# Patient Record
Sex: MALE | Race: WHITE | Employment: FULL TIME | URBAN - METROPOLITAN AREA
[De-identification: names, ages, dates, MRNs, and addresses within clinical notes are randomized per-mention and may not be internally consistent; named-entity substitution may affect disease eponyms.]

---

## 2022-10-17 ENCOUNTER — APPOINTMENT (OUTPATIENT)
Dept: GENERAL RADIOLOGY | Age: 52
End: 2022-10-17
Payer: COMMERCIAL

## 2022-10-17 ENCOUNTER — HOSPITAL ENCOUNTER (EMERGENCY)
Age: 52
Discharge: HOME | End: 2022-10-17
Attending: STUDENT IN AN ORGANIZED HEALTH CARE EDUCATION/TRAINING PROGRAM
Payer: COMMERCIAL

## 2022-10-17 VITALS
RESPIRATION RATE: 17 BRPM | WEIGHT: 245 LBS | HEIGHT: 68 IN | BODY MASS INDEX: 37.13 KG/M2 | SYSTOLIC BLOOD PRESSURE: 146 MMHG | OXYGEN SATURATION: 99 % | HEART RATE: 79 BPM | DIASTOLIC BLOOD PRESSURE: 74 MMHG | TEMPERATURE: 98 F

## 2022-10-17 DIAGNOSIS — M25.561 ACUTE PAIN OF RIGHT KNEE: Primary | ICD-10-CM

## 2022-10-17 DIAGNOSIS — Q78.8 OSTEOPOIKILOSIS: ICD-10-CM

## 2022-10-17 PROCEDURE — 73564 X-RAY EXAM KNEE 4 OR MORE: CPT

## 2022-10-17 PROCEDURE — 99283 EMERGENCY DEPT VISIT LOW MDM: CPT

## 2022-10-17 ASSESSMENT — ENCOUNTER SYMPTOMS
EYE PAIN: 0
SORE THROAT: 0
EYE REDNESS: 0
NAUSEA: 0
BACK PAIN: 0
DIARRHEA: 0
SINUS PRESSURE: 0
COUGH: 0
VOMITING: 0
SHORTNESS OF BREATH: 0
WHEEZING: 0
EYE DISCHARGE: 0
ABDOMINAL PAIN: 0

## 2022-10-17 ASSESSMENT — PAIN SCALES - GENERAL
PAINLEVEL_OUTOF10: 7
PAINLEVEL_OUTOF10: 10

## 2022-10-17 ASSESSMENT — PAIN DESCRIPTION - ORIENTATION: ORIENTATION: RIGHT

## 2022-10-17 ASSESSMENT — PAIN DESCRIPTION - ONSET: ONSET: SUDDEN

## 2022-10-17 ASSESSMENT — PAIN DESCRIPTION - FREQUENCY: FREQUENCY: CONTINUOUS

## 2022-10-17 ASSESSMENT — PAIN DESCRIPTION - PAIN TYPE: TYPE: ACUTE PAIN

## 2022-10-17 ASSESSMENT — PAIN - FUNCTIONAL ASSESSMENT
PAIN_FUNCTIONAL_ASSESSMENT: 0-10
PAIN_FUNCTIONAL_ASSESSMENT: 0-10

## 2022-10-17 ASSESSMENT — PAIN DESCRIPTION - LOCATION: LOCATION: KNEE

## 2022-10-17 ASSESSMENT — PAIN DESCRIPTION - DESCRIPTORS: DESCRIPTORS: ACHING

## 2022-10-17 NOTE — ED PROVIDER NOTES
Department of Emergency Medicine   ED  Provider Note  Admit Date/RoomTime: 10/17/2022 11:17 AM  ED Room: 13/13              Citlali Go is a 45 y.o. male with a PMHx significant for None who presents for evaluation of right knee pain, beginning at arrival.  The complaint has been persistent, moderate in severity, and worsened by nothing. The patient states that he was feeling some discomfort behind his right knee about a month ago. Saw his PCP, was told it was secondary to running so much and was told to refrain from running. Notes that incident 2 weeks he did for 4 weeks and was on some meloxicam for this. Patient states that today was his first day back, did some light weights then went for a jog on the treadmill. States he was not on an incline going at a low speed about 5.5 when he felt a pop in his right knee and immediately had discomfort and pain. Denies any trauma to the area. The history is provided by the patient and medical records. Review of Systems   Constitutional:  Negative for chills and fever. HENT:  Negative for ear pain, sinus pressure and sore throat. Eyes:  Negative for pain, discharge and redness. Respiratory:  Negative for cough, shortness of breath and wheezing. Cardiovascular:  Negative for chest pain. Gastrointestinal:  Negative for abdominal pain, diarrhea, nausea and vomiting. Genitourinary:  Negative for dysuria and frequency. Musculoskeletal:  Negative for arthralgias and back pain. Right knee pain   Skin:  Negative for rash and wound. Neurological:  Negative for weakness and headaches. Hematological:  Negative for adenopathy. All other systems reviewed and are negative. Physical Exam  Vitals and nursing note reviewed. Constitutional:       General: He is not in acute distress. Appearance: Normal appearance. He is well-developed. He is not ill-appearing. HENT:      Head: Normocephalic and atraumatic.       Right Ear: External ear normal.      Left Ear: External ear normal.   Eyes:      General:         Right eye: No discharge. Left eye: No discharge. Extraocular Movements: Extraocular movements intact. Conjunctiva/sclera: Conjunctivae normal.   Cardiovascular:      Rate and Rhythm: Normal rate and regular rhythm. Heart sounds: Normal heart sounds. Pulmonary:      Effort: Pulmonary effort is normal. No respiratory distress. Breath sounds: Normal breath sounds. No stridor. Abdominal:      General: There is no distension. Palpations: Abdomen is soft. Musculoskeletal:         General: Tenderness present. Cervical back: Normal range of motion and neck supple. Comments: Decreased range of motion right lower extremity secondary to pain. There is no ligament laxity on valgus or varus stress test  There was some discomfort with posterior drawer and anterior drawer   Skin:     General: Skin is warm and dry. Coloration: Skin is not jaundiced or pale. Neurological:      General: No focal deficit present. Mental Status: He is alert. Procedures    SABA Yip presents to the ED for evaluation of right knee pain. Patient was on the treadmill when he heard a pop. . 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 . Patient was given crutches and Ace wrap for their symptoms. Patient continues to be non-toxic on re-evaluation. Findings were discussed with the patient and reasons to immediately return to the ED were articulated to them. They will follow-up with their orthopedic surgeon, Dr. Slava Parker. --------------------------------------------- PAST HISTORY ---------------------------------------------  Past Medical History:  has no past medical history on file. Past Surgical History:  has a past surgical history that includes lymph node biopsy and Finger amputation.     Social History: reports that he has never smoked. He quit smokeless tobacco use about 33 years ago. His smokeless tobacco use included chew. He reports current alcohol use. He reports that he does not use drugs. Family History: family history is not on file. The patients home medications have been reviewed. Allergies: Patient has no known allergies. -------------------------------------------------- RESULTS -------------------------------------------------  Labs:  No results found for this visit on 10/17/22. Radiology:  XR KNEE RIGHT (MIN 4 VIEWS)   Final Result   There are innumerable sclerotic lesions within the distal femur, proximal   tibia, and proximal fibula which likely represent enostoses associated with   osteopoikilosis. No acute bony abnormality.             ------------------------- NURSING NOTES AND VITALS REVIEWED ---------------------------  Date / Time Roomed:  10/17/2022 11:17 AM  ED Bed Assignment:  13/13    The nursing notes within the ED encounter and vital signs as below have been reviewed. BP (!) 151/84   Pulse 87   Temp 98 °F (36.7 °C) (Oral)   Resp 16   Ht 5' 8\" (1.727 m)   Wt 245 lb (111.1 kg)   SpO2 99%   BMI 37.25 kg/m²   Oxygen Saturation Interpretation: Normal      ------------------------------------------ PROGRESS NOTES ------------------------------------------  12:45 PM EDT  I have spoken with the patient and discussed todays results, in addition to providing specific details for the plan of care and counseling regarding the diagnosis and prognosis. Their questions are answered at this time and they are agreeable with the plan. I discussed at length with them reasons for immediate return here for re evaluation. They will followup with their orthopedic physician by calling their office tomorrow.       --------------------------------- ADDITIONAL PROVIDER NOTES ---------------------------------  At this time the patient is without objective evidence of an acute process requiring hospitalization or inpatient management. They have remained hemodynamically stable throughout their entire ED visit and are stable for discharge with outpatient follow-up. The plan has been discussed in detail and they are aware of the specific conditions for emergent return, as well as the importance of follow-up. New Prescriptions    No medications on file       Diagnosis:  1. Acute pain of right knee    2. Osteopoikilosis        Disposition:  Patient's disposition: Discharge to home  Patient's condition is stable.        Enrico Luong DO  10/17/22 1246

## 2022-10-17 NOTE — DISCHARGE INSTRUCTIONS
Follow up with your orthopedic surgeon  If symptoms worsen or concern arises return for re-evaluation

## 2022-10-27 ENCOUNTER — OFFICE VISIT (OUTPATIENT)
Dept: ORTHOPEDIC SURGERY | Age: 52
End: 2022-10-27
Payer: COMMERCIAL

## 2022-10-27 VITALS — HEIGHT: 68 IN | WEIGHT: 245 LBS | BODY MASS INDEX: 37.13 KG/M2

## 2022-10-27 DIAGNOSIS — S76.311A PARTIAL HAMSTRING TEAR, RIGHT, INITIAL ENCOUNTER: Primary | ICD-10-CM

## 2022-10-27 DIAGNOSIS — M25.561 ACUTE PAIN OF RIGHT KNEE: ICD-10-CM

## 2022-10-27 PROCEDURE — 99204 OFFICE O/P NEW MOD 45 MIN: CPT | Performed by: STUDENT IN AN ORGANIZED HEALTH CARE EDUCATION/TRAINING PROGRAM

## 2022-10-27 NOTE — PROGRESS NOTES
New Knee Patient     Referring Provider:   No referring provider defined for this encounter. CHIEF COMPLAINT:   Chief Complaint   Patient presents with    Knee Pain     Right knee pain / date of incident 1 week and 3 days ago, working out on treadmill and felt a pop, felt pain and had difficulty walking. Last month running felt pain, saw PCP told he had strained tendons, took one month off from running. Went to [facility], x-rays done in epic        HPI:    Anita Frias is a 45y.o. year old male who injured his right knee about 1 week ago. He has had on and off knee pain for a couple of months. Last week he was working on a treadmill and felt a pop. He had difficulty ambulating afterwards. The pain is located in the posterior lateral knee along his biceps femoris tendon. Pain is sharp and nonradiating. Denies numbness tingling. Denies fevers and chills. PAST MEDICAL HISTORY  History reviewed. No pertinent past medical history. PAST SURGICAL HISTORY  Past Surgical History:   Procedure Laterality Date    FINGER AMPUTATION      LYMPH NODE BIOPSY           FAMILY HISTORY   History reviewed. No pertinent family history.     SOCIAL HISTORY  Social History     Socioeconomic History    Marital status:      Spouse name: Not on file    Number of children: Not on file    Years of education: Not on file    Highest education level: Not on file   Occupational History    Not on file   Tobacco Use    Smoking status: Never    Smokeless tobacco: Former     Types: Chew     Quit date: 1/6/1989   Substance and Sexual Activity    Alcohol use: Yes     Comment: ocassional    Drug use: No    Sexual activity: Yes   Other Topics Concern    Not on file   Social History Narrative    Not on file     Social Determinants of Health     Financial Resource Strain: Not on file   Food Insecurity: Not on file   Transportation Needs: Not on file   Physical Activity: Not on file   Stress: Not on file   Social Connections: Not on file   Intimate Partner Violence: Not on file   Housing Stability: Not on file     Social History     Occupational History    Not on file   Tobacco Use    Smoking status: Never    Smokeless tobacco: Former     Types: Chew     Quit date: 1/6/1989   Substance and Sexual Activity    Alcohol use: Yes     Comment: ocassional    Drug use: No    Sexual activity: Yes       CURRENT MEDICATIONS   No current outpatient medications on file. ALLERGIES  No Known Allergies    Controlled Substances Monitoring:          REVIEW OF SYSTEMS:     Constitutional:  Negative for weight loss, fevers, chills, fatigue  Cardiovascular: Negative for chest pain, palpitations  Pulmonary: Negative for shortness of breath, labored breathing, cough  GI: negative for abdominal pain, nausea, vomitting   MSK: per HPI  Skin: negative for rash, open wounds    All other systems reviewed and are negative         PHYSICAL EXAM     Vitals:    10/27/22 0913   Weight: 245 lb (111.1 kg)   Height: 5' 8\" (1.727 m)       Height: 5' 8\" (1.727 m)  Weight: [unfilled]  BMI:  Body mass index is 37.25 kg/m². General: The patient is alert and oriented x 3, appears to be stated age and in no distress. HEENT: head is normocephalic, atraumatic. EOMI. Neck: supple, trachea midline, no thyromegaly   Cardiovascular: peripheral pulses palpable. Normal Capillary refill   Respiratory: breathing unlabored, chest expansion symmetric   Skin: no rash, no open wounds, no erythema  Psych: normal affect; mood stable  Neurologic: gait normal, sensation grossly intact in extremities  MSK:        Lower Extremity:   Ipsilateral hip exam shows normal range of motion without pain with impingement testing. Right knee: Atraumatic. No effusion. Full range of motion from 0 to 130 degrees. Knee stable varus valgus stress testing at 0 and 30 degrees. Negative Lachman. Negative posterior drawer. Nontender along the medial lateral joint line.   He has pain with resisted knee flexion posterior lateral.  He is tender along his biceps femoris tendon. IMAGING:    XR: 4 views of the right knee demonstrate no acute fractures dislocations. He has sclerotic lesions within his proximal tibia and distal femur consistent with osteopoikilosis. Joint spaces seem well-maintained. ASSESSMENT  Right knee biceps femoris strain versus partial tear    PLAN  -Conservative treatment discussed in detail. I Traci Gutierrez give the patient a hinged knee brace for comfort. He is to take a month off of running. I think this will heal without conservative treatment over-the-counter anti-inflammatories and ice. We will get him in formal physical therapy. If this is no better in a month we can talk about getting an MRI to to further assess. All of his questions were answered in detail.   He will follow-up on a as needed basis      Anjali Cruz, DO  Orthopaedic Surgery   10/27/22   9:12 AM KASSANDRAT

## 2022-11-14 ENCOUNTER — EVALUATION (OUTPATIENT)
Dept: PHYSICAL THERAPY | Age: 52
End: 2022-11-14
Payer: COMMERCIAL

## 2022-11-14 DIAGNOSIS — S76.311A PARTIAL HAMSTRING TEAR, RIGHT, INITIAL ENCOUNTER: Primary | ICD-10-CM

## 2022-11-14 PROCEDURE — 97110 THERAPEUTIC EXERCISES: CPT | Performed by: PHYSICAL THERAPIST

## 2022-11-14 PROCEDURE — 97161 PT EVAL LOW COMPLEX 20 MIN: CPT | Performed by: PHYSICAL THERAPIST

## 2022-11-14 NOTE — PROGRESS NOTES
[facility]          Phone: [phone] Fax: [phone]    Physical Therapy Daily Treatment Note  Date:  2022    Patient Name:  Kalee Cade    :  1970  MRN: [id number]    Evaluating therapist: Lashay Santana, PT, DPT  [id number]    Restrictions/Precautions:      Diagnosis:     Diagnosis Orders   1. Partial hamstring tear, right, initial encounter          Treatment Diagnosis:    Insurance/Certification information:  [employer]  Referring Physician:  Nitin Coats DO  Plan of care signed (Y/N):    Visit# / total visits:    Pain level: 1/10   Time In:  1300  Time Out:  1333    Subjective:  See initial eval    Exercises:  Exercise/Equipment Resistance/Repetitions Other comments            Hamstring stretch 30 sec x3 reps R LE      SLR 10 x2      SLR for VMO 10x      S/L Hip abduction 10 x2      Seated HS curl 10 x2 Purple TB            Bridges with HS curl on ball       Standing 3-way slide out       Step ups (fwd/lat)        Treadmill       Bike                                                        Other Therapeutic Activities:  Pt tolerated introduction of strengthening and demonstrated good technique/understanding for HEP. Pt noted to have muscular fatigue following TE's.     Home Exercise Program:  HS stretch, SLR, SLR for VMO, S/L Hip abduction, Seated HS curl    Manual Treatments:  N/A    Modalities:  N/A     Time-in Time-out Total Time   89672  Evaluation Low Complexity 1300 1312 12   16502  Evaluation Med Complexity      89665  Evaluation High Complexity      80399  Ther Ex 1312 1333 21   68627  Neuro Re-ed        89215  Ther Activities        14363  Manual Therapy       81239  E-stim       74183  Ultrasound            Session 7637 0141 33       Treatment/Activity Tolerance:  [x] Patient tolerated treatment well [] Patient limited by fatigue  [] Patient limited by pain  [] Patient limited by other medical complications  [] Other:     Prognosis: [x] Good [] Fair  [] Poor    Patient Requires Follow-up: [x] Yes  [] No    Plan:   [x] Continue per plan of care [] Alter current plan (see comments)  [] Plan of care initiated [] Hold pending MD visit [] Discharge    Plan for Next Session:  progress weight on SLR as tolerated, progress standing and dynamic knee stabilization exercises      Electronically signed by:  Gerri Payan, PT, DPT  [id number]

## 2022-11-14 NOTE — PROGRESS NOTES
[facility]   Phone: [phone]   Fax: [phone]         Date:  2022   Patient: Bev Lutz  : 1970  MRN: [id number]  Referring Provider: Bryan Jewell DO  [address]  [facility]     Medical Diagnosis:      Diagnosis Orders   1. Partial hamstring tear, right, initial encounter             SUBJECTIVE:     Onset date: Oct 16, 2022    Onset: Sudden onset    Mechanism of Injury: Walking on treadmill when R knee popped. Pt was working out with walking and gradually progressing into jogging then knee popped. Ortho dx with partial tear vs R biceps femoris strain. Previous PT: none     Medical Management for Current Problem:  Colton Merino Hinged ROM brace, no restriction of movement noted    Chief complaint: weakness, unable to run / difficulty running , difficulty with stairs    Behavior: condition is getting better    Pain: intermittent  Current: 1/10     Best: 0/10     Worst:6/10    Symptom Type/Quality: aching  Location[de-identified] Knee: lateral, popliteral     Aggravated by:  nothing identified at this time    Relieved by: nothing    Imaging results: XR KNEE RIGHT (MIN 4 VIEWS)    Result Date: 10/17/2022  EXAMINATION: FOUR XRAY VIEWS OF THE RIGHT KNEE 10/17/2022 11:29 am COMPARISON: None. HISTORY: ORDERING SYSTEM PROVIDED HISTORY: pain TECHNOLOGIST PROVIDED HISTORY: Reason for exam:->pain FINDINGS: AP, lateral, and bilateral oblique views of the right knee were obtained. There are numerous sclerotic bony lesions about the distal femur, proximal tibia, and proximal fibula. These likely represent multiple enostoses associated with osteopoikilosis. There is no acute fracture or dislocation. Joint spaces are preserved in all 3 compartments without evidence of osteophytosis. There is no definite suprapatellar knee joint effusion. The visualized soft tissue structures are unremarkable.      There are innumerable sclerotic lesions within the distal femur, proximal tibia, and proximal fibula which likely represent enostoses associated with osteopoikilosis. No acute bony abnormality. Past Medical History:  No past medical history on file. Past Surgical History:   Procedure Laterality Date    FINGER AMPUTATION      LYMPH NODE BIOPSY         Medications:   No current outpatient medications on file. Current Facility-Administered Medications   Medication Dose Route Frequency Provider Last Rate Last Admin    betamethasone acetate-betamethasone sodium phosphate (CELESTONE) injection 12 mg  12 mg Intra-artICUlar Once Mahnaz Agosto MD        lidocaine 1 % injection 2 mL  2 mL IntraDERmal Once Mahnaz Agosto MD           Occupation:  Criminal justice educator . Physical demands include: standing, sitting. Status: full time. Exercise regimen: weight training , running, rowing, cycle    Hobbies: golfing, cards/gambling    Patient Goals:  return to running, get back to normal    Precautions/Contraindications:  brace for 2 weeks, start therapy, follow up with ortho in 30 days if no improvement. Family MD recommended against running.      OBJECTIVE:     Observations: well nourished male    Inspection: normal orthopedic exam    Edema: none     Gait: normal    Joint/Motion:    Knee:  Right:   AROM: 125° Flexion,  0° Extension    Left:   AROM: 123° Flexion,  0° Extension      Strength:    Knee:   Right: Hip: 5/5, Knee: Flexion 4-/5,  Extension 4+/5  Left: Hip: 5/5, Knee: Flexion 5/5,  Extension 5/5    Palpation: No tenderness     Special Tests/Functional Screens:    [] Lachman's []+ / [] -    [] Anterior Drawer []+ / [] -   [] Valgus Stress []+ / [] -  [] Thessaly Test []+ / [] -   [] Kallie's Sign []+ / [] -   [] Apley compression: []+ / [] - [] Bounce Home []+ / [] -   [] Adrián []+ / [] -   [] Pivot Shift []+ / [] -   [] Posterior Drawer []+ / [] -   [] Varus Stress []+ / [] -   [] Patellar Tracking: []+ / [] -     Special test comments: N/A      ASSESSMENT     Outcome Measure:   Lower Extremity Functional Scale (LEFS) 52/80    Problems:   Pain reported 1-6/10  Strength decreased in R quad  Decreased functional ability with running, stairs, use of right lower extremity, inability to participate in exercise regimen / fitness program    Reason for Skilled Care: Pt presents to therapy s/p injury to R lateral hamstring at biceps femoris region. Pt has R quad weakness following injury and reports he has been limited in workout routine to allow healing. He will benefit from skilled PT services to improve strength and stability of R knee to allow return to running and full workout. [x] There are no barriers affecting plan of care or recovery    [] Barriers to this patient's plan of care or recovery include. Domestic Concerns:  [x] No  [] Yes:    Long Term goals (4-6 weeks)  Decrease reported pain to 0-1/10  Increase Strength to 5/5   Able to perform/complete the following functions/tasks: jog for 20 minutes with 0-1/10 R knee/hamstring discomfort, ascend/descend 10 stairs with reciprocal pattern and no HR with 0-1/10 discomfort  LEFS 72/80  Independent with Home Exercise Programs    Rehab Potential: [x] Good  [] Fair  [] Poor    PLAN       Treatment Plan:   [x] Therapeutic Exercise  [x] Therapeutic Activity  [x] Neuromuscular Re-education   [x] Gait Training  [x] Balance Training  [] Aerobic conditioning  [x] Manual Therapy  [] Massage/Fascial release   [] Work/Sport specific activities    [] Pain Neuroscience [x] Cold/hotpack  [] Vasocompression  [x] Electrical Stimulation  [] Lumbar/Cervical Traction  [x] Ultrasound   [] Iontophoresis: 4 mg/mL Dexamethasone Sodium Phosphate 40-80 mAmin  [x] Dry Needling      [x] Instruction in HEP      []  Medication allergies reviewed for use of Dexamethasone Sodium Phosphate 4mg/ml  with iontophoresis treatments. Patient is not allergic.       The following CPT codes are likely to be used in the care of this patient: 46846 PT Evaluation: Low Complexity, [id number] PT Re-Evaluation, 70061 Therapeutic Exercise, [id number] Neuromuscular Re-Education, 75891 Therapeutic Activities, 90255 Manual Therapy, 89964 Gait Training, 75842 Electric Stimulation, and [id number] Ultrasound      Suggested Professional Referral: [x] No  [] Yes:     Patient Education:  [x] Plans/Goals, Risks/Benefits discussed  [x] Home exercise program  Method of Education: [x] Verbal  [x] Demo  [x] Written  Comprehension of Education:  [x] Verbalizes understanding. [x] Demonstrates understanding. [] Needs Review. [] Demonstrates/verbalizes understanding of HEP/Ed previously given. Frequency: 1-2 days per week for 4-6 weeks    Patient understands diagnosis/prognosis and consents to treatment, plan and goals: [x] Yes    [] No     Thank you for the opportunity to work with your patient. If you have questions or comments, please contact me at numbers listed above. Electronically signed by: Johanna Michel, PT, DPT  [id number]    Medicare Patients Only     Please sign Physician's Certification and return to: [address]  [facility]  [address] [id number]  Dept: [phone]  Dept Fax: [phone]  Loc: [phone] Certification / Comments     Frequency/Duration 1-2 days per week for 4-6 weeks. Certification period from 11/14/2022  to 2/17/2023. I have reviewed the Plan of Care established for skilled therapy services and certify that the services are required and that they will be provided while the patient is under my care.     Physician's Comments/Revisions:               Physician's Printed Name:                                           [de-identified] Signature:                                                               Date:

## 2022-11-16 ENCOUNTER — TREATMENT (OUTPATIENT)
Dept: PHYSICAL THERAPY | Age: 52
End: 2022-11-16
Payer: COMMERCIAL

## 2022-11-16 DIAGNOSIS — S76.311A PARTIAL HAMSTRING TEAR, RIGHT, INITIAL ENCOUNTER: Primary | ICD-10-CM

## 2022-11-16 PROCEDURE — 97110 THERAPEUTIC EXERCISES: CPT

## 2022-11-16 NOTE — PROGRESS NOTES
[facility]          Phone: [phone] Fax: [phone]    Physical Therapy Daily Treatment Note  Date:  2022    Patient Name:  Leonid Reyes    :  1970  MRN: [id number]    Evaluating therapist: Zoe Borges PT, DPT  [id number]    Restrictions/Precautions:      Diagnosis:     Diagnosis Orders   1. Partial hamstring tear, right, initial encounter          Treatment Diagnosis:    Insurance/Certification information:  Aetna  Referring Physician:  Maude Camarena DO  Plan of care signed (Y/N):    Visit# / total visits:    Pain level: 1/10   Time In:  1300  Time Out:  1331    Subjective:  pt had no new report    Exercises:  Exercise/Equipment Resistance/Repetitions Other comments            Hamstring stretch 30 sec x3 reps R LE      SLR 10 x2      SLR for VMO 10x      S/L Hip abduction 10 x2      Seated HS curl 10 x2 Purple TB            Bridges with HS curl on ball X 10 reps       Standing 3-way slide out X 10 reps B       Step ups (fwd/lat) X 10 reps each R LE       Treadmill X 8 mins 1.8 MPH level deck      Bike                                                        Other Therapeutic Activities:  Pt experienced R HS \" spasm / cramping \" after performing bridges w/ HS curl on ball. This was relieved w/ pt performing HS stretch supine w/ trigger point release provided by therapist.  Pt noted to have muscular fatigue following TE's.     Home Exercise Program:  HS stretch, SLR, SLR for VMO, S/L Hip abduction, Seated HS curl    Manual Treatments:  N/A    Modalities:  N/A     Time-in Time-out Total Time   21131  Evaluation Low Complexity      65121  Evaluation Med Complexity      21475  Evaluation High Complexity      64743  Ther Ex 1300 1331 31   30882  Neuro Re-ed        16006  Ther Activities        88810  Manual Therapy       19283  E-stim       60084  Ultrasound            Session 1300 1331 31       Treatment/Activity Tolerance:  [x] Patient tolerated treatment well [] Patient limited by fatigue  [] Patient limited by pain  [] Patient limited by other medical complications  [] Other:     Prognosis: [x] Good [] Fair  [] Poor    Patient Requires Follow-up: [x] Yes  [] No    Plan:   [x] Continue per plan of care [] Alter current plan (see comments)  [] Plan of care initiated [] Hold pending MD visit [] Discharge    Plan for Next Session:  progress weight on SLR as tolerated, progress standing and dynamic knee stabilization exercises      Electronically signed by:  Doron Hurt PTA [id number]

## 2022-11-21 ENCOUNTER — TREATMENT (OUTPATIENT)
Dept: PHYSICAL THERAPY | Age: 52
End: 2022-11-21
Payer: COMMERCIAL

## 2022-11-21 DIAGNOSIS — S76.311A PARTIAL HAMSTRING TEAR, RIGHT, INITIAL ENCOUNTER: Primary | ICD-10-CM

## 2022-11-21 PROCEDURE — 97110 THERAPEUTIC EXERCISES: CPT | Performed by: PHYSICAL THERAPIST

## 2022-11-21 NOTE — PROGRESS NOTES
[facility]          Phone: [phone] Fax: [phone]    Physical Therapy Daily Treatment Note  Date:  2022    Patient Name:  Bev Lutz    :  1970  MRN: [id number]    Evaluating therapist: Johanna Michel, PT, DPT  [id number]    Restrictions/Precautions:      Diagnosis:     Diagnosis Orders   1. Partial hamstring tear, right, initial encounter            Treatment Diagnosis:    Insurance/Certification information:  [employer]  Referring Physician:  Bryan Jewell DO  Plan of care signed (Y/N):    Visit# / total visits:  3/8  Pain level: 1/10   Time In:  1420  Time Out:  1550    Subjective:  Pt reports no new issues. He did have somewhat increased soreness following last session in R hamstring. Exercises:  Exercise/Equipment Resistance/Repetitions Other comments            Hamstring stretch 30 sec x4 reps R LE      SLR 10 x2 Add weight next session? SLR for VMO 10x      S/L Hip abduction 10 x2      Seated HS curl 10 x2 Purple TB            Bridges with HS curl on ball Hold this date d/t pain last session     Standing 3-way slide out X 10 reps B       Step ups (fwd/lat) X 10 reps each R LE 6\" step      Treadmill X 10 mins 2.5 MPH level deck     Standing HS curl with band at feet/ankle 10x2      Eccentric step down (fwd/lat) 4x each R LE 4\" step     Squat to chair height 10x             Prone quad stretch 30 sec x2 reps                           Other Therapeutic Activities:  Pt tolerated TE's this date with slight medial, distal quad discomfort at end of session. Bridges with HS curls held this date d/t cramping with completion last session.     Home Exercise Program:  HS stretch, SLR, SLR for VMO, S/L Hip abduction, Seated HS curl    Manual Treatments:  cross friction to medial patellar retinaculum and VMO insertion post-treatment x5 min     Modalities:  N/A     Time-in Time-out Total Time   74505  Evaluation Low Complexity      57624  Evaluation Med Complexity 13602  Evaluation High Complexity      56439  Ther Ex 1420 1558 38   02936  Neuro Re-ed        70452  Ther Activities        05048  Manual Therapy       14667  E-stim       36896  Ultrasound            Session 8229 9812 16       Treatment/Activity Tolerance:  [x] Patient tolerated treatment well [] Patient limited by fatigue  [] Patient limited by pain  [] Patient limited by other medical complications  [] Other:     Prognosis: [x] Good [] Fair  [] Poor    Patient Requires Follow-up: [x] Yes  [] No    Plan:   [x] Continue per plan of care [] Alter current plan (see comments)  [] Plan of care initiated [] Hold pending MD visit [] Discharge    Plan for Next Session:  progress weight on SLR as tolerated, progress standing and dynamic knee stabilization exercises      Electronically signed by:  Danis Hawk, PT, DPT [id number]

## 2022-11-29 ENCOUNTER — TREATMENT (OUTPATIENT)
Dept: PHYSICAL THERAPY | Age: 52
End: 2022-11-29
Payer: COMMERCIAL

## 2022-11-29 DIAGNOSIS — S76.311A PARTIAL HAMSTRING TEAR, RIGHT, INITIAL ENCOUNTER: Primary | ICD-10-CM

## 2022-11-29 PROCEDURE — 97110 THERAPEUTIC EXERCISES: CPT

## 2022-11-29 NOTE — PROGRESS NOTES
[facility]          Phone: [phone] Fax: [phone]    Physical Therapy Daily Treatment Note  Date:  2022    Patient Name:  Estee Villa    :  1970  MRN: [id number]    Evaluating therapist: Zaki Lambert PT, DPT  [id number]    Restrictions/Precautions:      Diagnosis:     Diagnosis Orders   1. Partial hamstring tear, right, initial encounter            Treatment Diagnosis:    Insurance/Certification information:  [employer]  Referring Physician:  Romayne Sorenson, DO  Plan of care signed (Y/N):    Visit# / total visits:    Pain level: 0/10   Time In:  800  Time Out:  830    Subjective:  Pt reports no new issues. He stated he is going to do a lite work out before  session to see how he feels . Exercises:  Exercise/Equipment Resistance/Repetitions Other comments            Hamstring stretch 30 sec x4 reps R LE      SLR 2# 10 x2 Add weight next session? SLR for VMO 2# 10x      S/L Hip abduction 2# 10 x2      Seated HS curl 10 x2 Purple TB            Bridges with HS curl on ball Hold this date d/t pain last session     Standing 3-way slide out X 10 reps B       Step ups (fwd/lat) X 10 reps each R LE 6\" step      Treadmill X 10 mins 2.5 MPH level deck     Standing HS curl with band at feet/ankle 10x2  BTB      Eccentric step down (fwd/lat) 4x each R LE 4\" step     Squat to chair height 10x             Prone quad stretch 30 sec x2 reps                           Other Therapeutic Activities:  Pt tolerated TE's this date with NO  medial, distal quad discomfort at end of session.     Home Exercise Program:  HS stretch, SLR, SLR for VMO, S/L Hip abduction, Seated HS curl    Manual Treatments:  cross friction to medial patellar retinaculum and VMO insertion post-treatment x5 min     Modalities:  N/A     Time-in Time-out Total Time   63608  Evaluation Low Complexity      17255  Evaluation Med Complexity      29358  Evaluation High Complexity      87793  Ther Ex 800 830 30   17515  Neuro Re-ed        47583  Ther Activities        04213  Manual Therapy       81372  E-stim       12521  Ultrasound            Session 161 906 56       Treatment/Activity Tolerance:  [x] Patient tolerated treatment well [] Patient limited by fatigue  [] Patient limited by pain  [] Patient limited by other medical complications  [] Other:     Prognosis: [x] Good [] Fair  [] Poor    Patient Requires Follow-up: [x] Yes  [] No    Plan:   [x] Continue per plan of care [] Alter current plan (see comments)  [] Plan of care initiated [] Hold pending MD visit [] Discharge    Plan for Next Session:  progress weight on SLR as tolerated, progress standing and dynamic knee stabilization exercises      Electronically signed by:  Safia Palma PTA [id number]

## 2022-12-02 ENCOUNTER — TREATMENT (OUTPATIENT)
Dept: PHYSICAL THERAPY | Age: 52
End: 2022-12-02

## 2022-12-02 DIAGNOSIS — S76.311A PARTIAL HAMSTRING TEAR, RIGHT, INITIAL ENCOUNTER: Primary | ICD-10-CM

## 2022-12-02 NOTE — PROGRESS NOTES
[facility]          Phone: [phone] Fax: [phone]    Physical Therapy Daily Treatment Note  Date:  2022    Patient Name:  Estee Villa    :  1970  MRN: [id number]    Evaluating therapist: Zaki Lambert PT, DPT  [id number]    Restrictions/Precautions:      Diagnosis:     Diagnosis Orders   1. Partial hamstring tear, right, initial encounter              Treatment Diagnosis:    Insurance/Certification information:  [employer]  Referring Physician:  Romayne Sorenson, DO  Plan of care signed (Y/N):  yes  Visit# / total visits:    Pain level: 0/10   Time In:  0920  Time Out:  8051    Subjective:  Pt reports no new issues. He stated he was able to tolerate jogging for 2 min and 10 min biking at the gym without pain. Pt requesting d/c this date as all symptoms resolved and he has been able to return to workouts. Exercises:  Exercise/Equipment Resistance/Repetitions Other comments            Hamstring stretch      SLR 10 x2      SLR for VMO  10 x2      S/L Hip abduction      Seated HS curl Purple TB            Bridges with HS curl on ball Hold this date d/t pain last session     Standing 3-way slide out X 10 reps B       Step ups (fwd/lat) X 10 reps each R LE 6\" step      Treadmill X 10 mins Intervals, 1 min jog/1min walk    Standing HS curl with band at feet/ankle 10x2  BTB      Eccentric step down (fwd/lat) 10x each R LE, held laterally d/t medial knee pain. 4\" step     Squat to chair height          Prone quad stretch             LEFS: 80/80       MMT: 5/5 B LE       Other Therapeutic Activities:  Pt tolerated TE's this date with NO  medial, distal quad discomfort at end of session. Pt reports he feels he would be able to jog for 20 min without knee pain at this time if not limited by cardiovascular endurance. He is able to complete stair negotiation reciprocally without pain.     Home Exercise Program:  HS stretch, SLR, SLR for VMO, S/L Hip abduction, Seated HS curl    Manual Treatments:  cross friction to medial patellar retinaculum and VMO insertion post-treatment x5 min     Modalities:  N/A     Time-in Time-out Total Time   00469  Evaluation Low Complexity      02495  Evaluation Med Complexity      88045  Evaluation High Complexity      82304  Ther Ex 0920 0955 35   26271  Neuro Re-ed        32573  Ther Activities        85010  Manual Therapy       12488  E-stim       92281  Ultrasound            Session 0920 0955 35       Treatment/Activity Tolerance:  [x] Patient tolerated treatment well [] Patient limited by fatigue  [] Patient limited by pain  [] Patient limited by other medical complications  [] Other:     Prognosis: [x] Good [] Fair  [] Poor    Patient Requires Follow-up: [x] Yes  [] No    Plan:   [x] Continue per plan of care [] Alter current plan (see comments)  [] Plan of care initiated [] Hold pending MD visit [] Discharge    Plan for Next Session:  progress weight on SLR as tolerated, progress standing and dynamic knee stabilization exercises      Electronically signed by: Colleen Rosenberg, PT, DPT  [id number]

## 2022-12-02 NOTE — PROGRESS NOTES
[facility]                Phone: [phone] Fax: [phone]    Physical Therapy  Outpatient Discharge Summary     Date:  2022    Patient Name:  Jessica Rogers    :  1970  MRN: [id number]    DIAGNOSIS:     Diagnosis Orders   1. Partial hamstring tear, right, initial encounter          REFERRING PHYSICIAN:  Michael Iglesias DO    ATTENDANCE:  Pt has attended 5 of 5 scheduled treatments from 2022 to 2022. TREATMENTS RECEIVED:  Pt received skilled services including stretching for improved ROM and muscle tension, strengthening, and development of personalized HEP. INITIAL STATUS:  Pain reported 1-6/10  Strength decreased in R quad  Decreased functional ability with running, stairs, use of right lower extremity, inability to participate in exercise regimen / fitness program  LEFS:     FINAL STATUS:  Pain reported 0-1/10  Strength 5/5 in R LE  Pt is able to return to workouts with progression to resume endurance, jogging tolerated in clinic with only limitations d/t cardiovascular endurance, stair negotiation restore to reciprocal pattern. LEFS: 80/80    GOALS:  5 out of 5 Long Term Goals were obtained. LONG TERM GOALS NOT OBTAINED/REASON:  N/A, all LTG met    PATIENT GOALS:  return to running, get back to normal --Met    REASON FOR DISCHARGE:  all LTG met, symptoms resolved    PATIENT EDUCATION/INSTRUCTIONS:  continue with HEP and stretching to avoid reinjury    RECOMMENDATIONS:  follow up with ortho PRN        Thank you for the opportunity to work with your patient. If you have questions or comments, please feel free to contact me by phone or fax.       Electronically Signed by: Megan Wood, PT, DPT  [id number] 2022

## 2025-06-10 ENCOUNTER — HOSPITAL ENCOUNTER (OUTPATIENT)
Age: 55
Discharge: HOME | End: 2025-06-12

## 2025-06-16 LAB — SURGICAL PATHOLOGY REPORT: NORMAL
